# Patient Record
Sex: MALE | Race: WHITE | NOT HISPANIC OR LATINO | Employment: OTHER | URBAN - METROPOLITAN AREA
[De-identification: names, ages, dates, MRNs, and addresses within clinical notes are randomized per-mention and may not be internally consistent; named-entity substitution may affect disease eponyms.]

---

## 2018-04-25 ENCOUNTER — RECORDS - HEALTHEAST (OUTPATIENT)
Dept: LAB | Facility: CLINIC | Age: 73
End: 2018-04-25

## 2018-04-26 LAB
ANION GAP SERPL CALCULATED.3IONS-SCNC: 12 MMOL/L (ref 5–18)
BUN SERPL-MCNC: 11 MG/DL (ref 8–28)
CALCIUM SERPL-MCNC: 10.1 MG/DL (ref 8.5–10.5)
CHLORIDE BLD-SCNC: 100 MMOL/L (ref 98–107)
CO2 SERPL-SCNC: 26 MMOL/L (ref 22–31)
CREAT SERPL-MCNC: 0.7 MG/DL (ref 0.7–1.3)
ERYTHROCYTE [DISTWIDTH] IN BLOOD BY AUTOMATED COUNT: 13.5 % (ref 11–14.5)
GFR SERPL CREATININE-BSD FRML MDRD: >60 ML/MIN/1.73M2
GLUCOSE BLD-MCNC: 114 MG/DL (ref 70–125)
HCT VFR BLD AUTO: 43.7 % (ref 40–54)
HGB BLD-MCNC: 14.5 G/DL (ref 14–18)
MCH RBC QN AUTO: 32.2 PG (ref 27–34)
MCHC RBC AUTO-ENTMCNC: 33.2 G/DL (ref 32–36)
MCV RBC AUTO: 97 FL (ref 80–100)
PLATELET # BLD AUTO: 252 THOU/UL (ref 140–440)
PMV BLD AUTO: 10.7 FL (ref 8.5–12.5)
POTASSIUM BLD-SCNC: 3.9 MMOL/L (ref 3.5–5)
RBC # BLD AUTO: 4.51 MILL/UL (ref 4.4–6.2)
SODIUM SERPL-SCNC: 138 MMOL/L (ref 136–145)
WBC: 5.8 THOU/UL (ref 4–11)

## 2020-10-12 ENCOUNTER — RECORDS - HEALTHEAST (OUTPATIENT)
Dept: LAB | Facility: CLINIC | Age: 75
End: 2020-10-12

## 2020-10-13 LAB
ALBUMIN SERPL-MCNC: 3.1 G/DL (ref 3.5–5)
ALP SERPL-CCNC: 89 U/L (ref 45–120)
ALT SERPL W P-5'-P-CCNC: 18 U/L (ref 0–45)
ANION GAP SERPL CALCULATED.3IONS-SCNC: 5 MMOL/L (ref 5–18)
AST SERPL W P-5'-P-CCNC: 16 U/L (ref 0–40)
BILIRUB DIRECT SERPL-MCNC: 0.2 MG/DL
BILIRUB SERPL-MCNC: 0.5 MG/DL (ref 0–1)
BUN SERPL-MCNC: 9 MG/DL (ref 8–28)
CALCIUM SERPL-MCNC: 9.3 MG/DL (ref 8.5–10.5)
CHLORIDE BLD-SCNC: 101 MMOL/L (ref 98–107)
CHOLEST SERPL-MCNC: 113 MG/DL
CO2 SERPL-SCNC: 30 MMOL/L (ref 22–31)
CREAT SERPL-MCNC: 0.69 MG/DL (ref 0.7–1.3)
ERYTHROCYTE [DISTWIDTH] IN BLOOD BY AUTOMATED COUNT: 15.7 % (ref 11–14.5)
FASTING STATUS PATIENT QL REPORTED: YES
GFR SERPL CREATININE-BSD FRML MDRD: >60 ML/MIN/1.73M2
GLUCOSE BLD-MCNC: 95 MG/DL (ref 70–125)
HBA1C MFR BLD: 6 %
HCT VFR BLD AUTO: 40 % (ref 40–54)
HDLC SERPL-MCNC: 30 MG/DL
HGB BLD-MCNC: 13 G/DL (ref 14–18)
LDLC SERPL CALC-MCNC: 67 MG/DL
MCH RBC QN AUTO: 28 PG (ref 27–34)
MCHC RBC AUTO-ENTMCNC: 32.5 G/DL (ref 32–36)
MCV RBC AUTO: 86 FL (ref 80–100)
PLATELET # BLD AUTO: 331 THOU/UL (ref 140–440)
PMV BLD AUTO: 9.9 FL (ref 8.5–12.5)
POTASSIUM BLD-SCNC: 4.1 MMOL/L (ref 3.5–5)
PROT SERPL-MCNC: 7.4 G/DL (ref 6–8)
RBC # BLD AUTO: 4.65 MILL/UL (ref 4.4–6.2)
SODIUM SERPL-SCNC: 136 MMOL/L (ref 136–145)
TRIGL SERPL-MCNC: 80 MG/DL
TSH SERPL DL<=0.005 MIU/L-ACNC: 1.85 UIU/ML (ref 0.3–5)
WBC: 5.7 THOU/UL (ref 4–11)

## 2023-09-09 ENCOUNTER — APPOINTMENT (OUTPATIENT)
Dept: GENERAL RADIOLOGY | Facility: CLINIC | Age: 78
End: 2023-09-09
Attending: STUDENT IN AN ORGANIZED HEALTH CARE EDUCATION/TRAINING PROGRAM
Payer: MEDICARE

## 2023-09-09 ENCOUNTER — APPOINTMENT (OUTPATIENT)
Dept: CT IMAGING | Facility: CLINIC | Age: 78
End: 2023-09-09
Attending: STUDENT IN AN ORGANIZED HEALTH CARE EDUCATION/TRAINING PROGRAM
Payer: MEDICARE

## 2023-09-09 ENCOUNTER — HOSPITAL ENCOUNTER (OUTPATIENT)
Facility: CLINIC | Age: 78
Setting detail: OBSERVATION
Discharge: HOME | End: 2023-09-11
Attending: STUDENT IN AN ORGANIZED HEALTH CARE EDUCATION/TRAINING PROGRAM | Admitting: INTERNAL MEDICINE
Payer: MEDICARE

## 2023-09-09 DIAGNOSIS — M79.662 PAIN OF LEFT LOWER LEG: ICD-10-CM

## 2023-09-09 DIAGNOSIS — M54.50 ACUTE BILATERAL LOW BACK PAIN WITHOUT SCIATICA: ICD-10-CM

## 2023-09-09 DIAGNOSIS — R53.1 WEAKNESS: ICD-10-CM
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
## 2023-09-09 DIAGNOSIS — Z87.820 HISTORY OF TRAUMATIC BRAIN INJURY: ICD-10-CM

## 2023-09-09 DIAGNOSIS — Z86.73 HISTORY OF CVA (CEREBROVASCULAR ACCIDENT): ICD-10-CM

## 2023-09-09 LAB
ANION GAP SERPL CALCULATED.3IONS-SCNC: 15 MMOL/L (ref 7–15)
BASOPHILS # BLD AUTO: 0.1 10E3/UL (ref 0–0.2)
BASOPHILS NFR BLD AUTO: 1 %
BUN SERPL-MCNC: 10.5 MG/DL (ref 8–23)
CALCIUM SERPL-MCNC: 9.2 MG/DL (ref 8.8–10.2)
CHLORIDE SERPL-SCNC: 96 MMOL/L (ref 98–107)
CREAT SERPL-MCNC: 0.53 MG/DL (ref 0.67–1.17)
DEPRECATED HCO3 PLAS-SCNC: 22 MMOL/L (ref 22–29)
EGFRCR SERPLBLD CKD-EPI 2021: >90 ML/MIN/1.73M2
EOSINOPHIL # BLD AUTO: 0.1 10E3/UL (ref 0–0.7)
EOSINOPHIL NFR BLD AUTO: 2 %
ERYTHROCYTE [DISTWIDTH] IN BLOOD BY AUTOMATED COUNT: 14.8 % (ref 10–15)
GLUCOSE SERPL-MCNC: 116 MG/DL (ref 70–99)
HCT VFR BLD AUTO: 40.6 % (ref 40–53)
HGB BLD-MCNC: 13.5 G/DL (ref 13.3–17.7)
IMM GRANULOCYTES # BLD: 0 10E3/UL
IMM GRANULOCYTES NFR BLD: 0 %
LYMPHOCYTES # BLD AUTO: 1.6 10E3/UL (ref 0.8–5.3)
LYMPHOCYTES NFR BLD AUTO: 22 %
MCH RBC QN AUTO: 29.4 PG (ref 26.5–33)
MCHC RBC AUTO-ENTMCNC: 33.3 G/DL (ref 31.5–36.5)
MCV RBC AUTO: 89 FL (ref 78–100)
MONOCYTES # BLD AUTO: 0.6 10E3/UL (ref 0–1.3)
MONOCYTES NFR BLD AUTO: 9 %
NEUTROPHILS # BLD AUTO: 4.9 10E3/UL (ref 1.6–8.3)
NEUTROPHILS NFR BLD AUTO: 66 %
NRBC # BLD AUTO: 0 10E3/UL
NRBC BLD AUTO-RTO: 0 /100
PLATELET # BLD AUTO: 345 10E3/UL (ref 150–450)
POTASSIUM SERPL-SCNC: 4.1 MMOL/L (ref 3.4–5.3)
RBC # BLD AUTO: 4.59 10E6/UL (ref 4.4–5.9)
SODIUM SERPL-SCNC: 133 MMOL/L (ref 136–145)
WBC # BLD AUTO: 7.3 10E3/UL (ref 4–11)

## 2023-09-09 PROCEDURE — G0378 HOSPITAL OBSERVATION PER HR: HCPCS

## 2023-09-09 PROCEDURE — 250N000013 HC RX MED GY IP 250 OP 250 PS 637: Performed by: INTERNAL MEDICINE

## 2023-09-09 PROCEDURE — 99223 1ST HOSP IP/OBS HIGH 75: CPT | Mod: AI | Performed by: INTERNAL MEDICINE

## 2023-09-09 PROCEDURE — 96374 THER/PROPH/DIAG INJ IV PUSH: CPT

## 2023-09-09 PROCEDURE — 250N000013 HC RX MED GY IP 250 OP 250 PS 637: Performed by: STUDENT IN AN ORGANIZED HEALTH CARE EDUCATION/TRAINING PROGRAM

## 2023-09-09 PROCEDURE — 73552 X-RAY EXAM OF FEMUR 2/>: CPT | Mod: LT

## 2023-09-09 PROCEDURE — 96375 TX/PRO/DX INJ NEW DRUG ADDON: CPT

## 2023-09-09 PROCEDURE — 70450 CT HEAD/BRAIN W/O DYE: CPT

## 2023-09-09 PROCEDURE — 250N000011 HC RX IP 250 OP 636: Performed by: STUDENT IN AN ORGANIZED HEALTH CARE EDUCATION/TRAINING PROGRAM

## 2023-09-09 PROCEDURE — 72125 CT NECK SPINE W/O DYE: CPT

## 2023-09-09 PROCEDURE — 72131 CT LUMBAR SPINE W/O DYE: CPT

## 2023-09-09 PROCEDURE — 99285 EMERGENCY DEPT VISIT HI MDM: CPT | Mod: 25

## 2023-09-09 PROCEDURE — G1010 CDSM STANSON: HCPCS

## 2023-09-09 PROCEDURE — 80048 BASIC METABOLIC PNL TOTAL CA: CPT | Performed by: STUDENT IN AN ORGANIZED HEALTH CARE EDUCATION/TRAINING PROGRAM

## 2023-09-09 PROCEDURE — 85025 COMPLETE CBC W/AUTO DIFF WBC: CPT | Performed by: STUDENT IN AN ORGANIZED HEALTH CARE EDUCATION/TRAINING PROGRAM

## 2023-09-09 PROCEDURE — 72128 CT CHEST SPINE W/O DYE: CPT

## 2023-09-09 PROCEDURE — 36415 COLL VENOUS BLD VENIPUNCTURE: CPT | Performed by: STUDENT IN AN ORGANIZED HEALTH CARE EDUCATION/TRAINING PROGRAM

## 2023-09-09 PROCEDURE — 250N000011 HC RX IP 250 OP 636: Mod: JZ | Performed by: INTERNAL MEDICINE

## 2023-09-09 PROCEDURE — 72170 X-RAY EXAM OF PELVIS: CPT

## 2023-09-09 RX ORDER — OXYCODONE HYDROCHLORIDE 5 MG/1
5 TABLET ORAL ONCE
Status: COMPLETED | OUTPATIENT
Start: 2023-09-09 | End: 2023-09-09

## 2023-09-09 RX ORDER — AMOXICILLIN 250 MG
2 CAPSULE ORAL 2 TIMES DAILY
Status: DISCONTINUED | OUTPATIENT
Start: 2023-09-09 | End: 2023-09-11 | Stop reason: HOSPADM

## 2023-09-09 RX ORDER — CLOPIDOGREL BISULFATE 75 MG/1
75 TABLET ORAL DAILY
Status: DISCONTINUED | OUTPATIENT
Start: 2023-09-10 | End: 2023-09-11 | Stop reason: HOSPADM

## 2023-09-09 RX ORDER — NALOXONE HYDROCHLORIDE 0.4 MG/ML
0.2 INJECTION, SOLUTION INTRAMUSCULAR; INTRAVENOUS; SUBCUTANEOUS
Status: DISCONTINUED | OUTPATIENT
Start: 2023-09-09 | End: 2023-09-11 | Stop reason: HOSPADM

## 2023-09-09 RX ORDER — ACETAMINOPHEN 500 MG
1000 TABLET ORAL 3 TIMES DAILY
Status: DISCONTINUED | OUTPATIENT
Start: 2023-09-09 | End: 2023-09-11 | Stop reason: HOSPADM

## 2023-09-09 RX ORDER — FOLIC ACID 1 MG/1
1 TABLET ORAL DAILY
COMMUNITY

## 2023-09-09 RX ORDER — FERROUS SULFATE 325(65) MG
325 TABLET, DELAYED RELEASE (ENTERIC COATED) ORAL DAILY
COMMUNITY

## 2023-09-09 RX ORDER — TAMSULOSIN HYDROCHLORIDE 0.4 MG/1
0.8 CAPSULE ORAL DAILY
COMMUNITY

## 2023-09-09 RX ORDER — NALOXONE HYDROCHLORIDE 0.4 MG/ML
0.4 INJECTION, SOLUTION INTRAMUSCULAR; INTRAVENOUS; SUBCUTANEOUS
Status: DISCONTINUED | OUTPATIENT
Start: 2023-09-09 | End: 2023-09-11 | Stop reason: HOSPADM

## 2023-09-09 RX ORDER — POLYETHYLENE GLYCOL 3350 17 G/17G
17 POWDER, FOR SOLUTION ORAL DAILY PRN
Status: DISCONTINUED | OUTPATIENT
Start: 2023-09-09 | End: 2023-09-11 | Stop reason: HOSPADM

## 2023-09-09 RX ORDER — SCOLOPAMINE TRANSDERMAL SYSTEM 1 MG/1
1 PATCH, EXTENDED RELEASE TRANSDERMAL
Status: ON HOLD | COMMUNITY
End: 2023-09-11

## 2023-09-09 RX ORDER — ARIPIPRAZOLE 30 MG/1
30 TABLET ORAL DAILY
COMMUNITY

## 2023-09-09 RX ORDER — ASPIRIN 81 MG/1
81 TABLET ORAL DAILY
COMMUNITY

## 2023-09-09 RX ORDER — LIDOCAINE 4 G/G
1 PATCH TOPICAL EVERY 24 HOURS
Status: DISCONTINUED | OUTPATIENT
Start: 2023-09-10 | End: 2023-09-11 | Stop reason: HOSPADM

## 2023-09-09 RX ORDER — GLIPIZIDE 10 MG/1
1 TABLET ORAL 3 TIMES DAILY
COMMUNITY

## 2023-09-09 RX ORDER — CLOPIDOGREL BISULFATE 75 MG/1
75 TABLET ORAL DAILY
COMMUNITY

## 2023-09-09 RX ORDER — HYDROMORPHONE HYDROCHLORIDE 1 MG/ML
0.5 INJECTION, SOLUTION INTRAMUSCULAR; INTRAVENOUS; SUBCUTANEOUS ONCE
Status: COMPLETED | OUTPATIENT
Start: 2023-09-09 | End: 2023-09-09

## 2023-09-09 RX ORDER — ARIPIPRAZOLE 15 MG/1
30 TABLET ORAL DAILY
Status: DISCONTINUED | OUTPATIENT
Start: 2023-09-10 | End: 2023-09-11 | Stop reason: HOSPADM

## 2023-09-09 RX ORDER — GLIPIZIDE 10 MG/1
1 TABLET ORAL 3 TIMES DAILY
Status: DISCONTINUED | OUTPATIENT
Start: 2023-09-09 | End: 2023-09-11 | Stop reason: HOSPADM

## 2023-09-09 RX ORDER — AMOXICILLIN 250 MG
1 CAPSULE ORAL 2 TIMES DAILY
Status: DISCONTINUED | OUTPATIENT
Start: 2023-09-09 | End: 2023-09-11 | Stop reason: HOSPADM

## 2023-09-09 RX ORDER — IMIPRAMINE HCL 25 MG
75 TABLET ORAL AT BEDTIME
COMMUNITY

## 2023-09-09 RX ORDER — ONDANSETRON 2 MG/ML
4 INJECTION INTRAMUSCULAR; INTRAVENOUS EVERY 6 HOURS PRN
Status: DISCONTINUED | OUTPATIENT
Start: 2023-09-09 | End: 2023-09-11 | Stop reason: HOSPADM

## 2023-09-09 RX ORDER — TAMSULOSIN HYDROCHLORIDE 0.4 MG/1
0.8 CAPSULE ORAL DAILY
Status: DISCONTINUED | OUTPATIENT
Start: 2023-09-10 | End: 2023-09-11 | Stop reason: HOSPADM

## 2023-09-09 RX ORDER — ONDANSETRON 4 MG/1
4 TABLET, ORALLY DISINTEGRATING ORAL EVERY 6 HOURS PRN
Status: DISCONTINUED | OUTPATIENT
Start: 2023-09-09 | End: 2023-09-11 | Stop reason: HOSPADM

## 2023-09-09 RX ORDER — ASPIRIN 81 MG/1
81 TABLET ORAL DAILY
Status: DISCONTINUED | OUTPATIENT
Start: 2023-09-10 | End: 2023-09-11 | Stop reason: HOSPADM

## 2023-09-09 RX ORDER — ACETAMINOPHEN 500 MG
1000 TABLET ORAL 3 TIMES DAILY
COMMUNITY

## 2023-09-09 RX ORDER — LIDOCAINE 4 G/G
1 PATCH TOPICAL EVERY 24 HOURS
COMMUNITY

## 2023-09-09 RX ADMIN — IMIPRAMINE HYDROCHLORIDE 75 MG: 50 TABLET ORAL at 20:48

## 2023-09-09 RX ADMIN — OXYCODONE HYDROCHLORIDE 5 MG: 5 TABLET ORAL at 15:05

## 2023-09-09 RX ADMIN — SENNOSIDES AND DOCUSATE SODIUM 1 TABLET: 50; 8.6 TABLET ORAL at 20:48

## 2023-09-09 RX ADMIN — ONDANSETRON 4 MG: 2 INJECTION INTRAMUSCULAR; INTRAVENOUS at 20:59

## 2023-09-09 RX ADMIN — ACETAMINOPHEN 1000 MG: 500 TABLET, FILM COATED ORAL at 20:48

## 2023-09-09 RX ADMIN — HYDROMORPHONE HYDROCHLORIDE 0.5 MG: 1 INJECTION, SOLUTION INTRAMUSCULAR; INTRAVENOUS; SUBCUTANEOUS at 09:25

## 2023-09-09 RX ADMIN — OXYCODONE HYDROCHLORIDE 2.5 MG: 5 TABLET ORAL at 20:14

## 2023-09-09 RX ADMIN — DEXTRAN 70, GLYCERIN, HYPROMELLOSE 1 DROP: 1; 2; 3 SOLUTION/ DROPS OPHTHALMIC at 20:48

## 2023-09-09 ASSESSMENT — ACTIVITIES OF DAILY LIVING (ADL)
ADLS_ACUITY_SCORE: 35
ADLS_ACUITY_SCORE: 51
ADLS_ACUITY_SCORE: 35
ADLS_ACUITY_SCORE: 51
ADLS_ACUITY_SCORE: 51
ADLS_ACUITY_SCORE: 35

## 2023-09-09 NOTE — PLAN OF CARE
"Goal Outcome Evaluation:      Plan of Care Reviewed With: patient    Overall Patient Progress: no changeOverall Patient Progress: no change         PRIMARY DIAGNOSIS: ACUTE PAIN  OUTPATIENT/OBSERVATION GOALS TO BE MET BEFORE DISCHARGE:  1. Pain Status: Improved-controlled with oral pain medications.    2. Return to near baseline physical activity: No    3. Cleared for discharge by consultants (if involved): No    Discharge Planner Nurse   Safe discharge environment identified: No  Barriers to discharge: Yes       Entered by: Carol Roca RN 09/09/2023      Please review provider order for any additional goals.   Nurse to notify provider when observation goals have been met and patient is ready for discharge.    On initial assessment pt w/ continued HTN, all other VSS. Pt disorientated to time stating that it is 2002. Able to state that he is at a hospital in [place], but not sure why he is here, \"I fell\" does not remember why. Pt states that is L-foot is \"paralyzed\", reflexes equal in both feet, sensation equal in both feet, flexion equal and strong in both feet. Pt stating that pain is \"moderate\", denies the need for intervention. Regular diet ordered, denies N&/V, denies CP/SOB. C/o L-hip pain rating 4/10, no bruising or erythema, denies the  need for intervention. Frequent reorientation needed. Frequent rounding by writer.   "

## 2023-09-09 NOTE — H&P
"[facility]    History and Physical  Hospitalist       Date of Admission:  9/9/2023    Assessment & Plan   Sukh Robbins is a 77 year old man who presents with a fall and pain limiting ability to ambulate. 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 Patient has been residing in an assisted living facility in [place] where he receives assistance with medications and supervision with twice weekly showers. He has been able to dress himself, toilet independently. Facility provides meals. Patient ambulates with walker there. For the past 3-4 weeks, patient has been having increasing radicular left leg pain and more difficulty walking, as well as pain on sitting and standing. Sister Kisha and brother-in-law assist patient and leave nearby there and have provided history. They note that patient has undergone recent MRI spine imaging and was seen by neurosurgery who recommended against injections at that time and recommended physical therapy. Patient has been working with in-house PT and was planned to start outpatient PT on 9/20 after return from this [place] trip.     Patient came to [place] for a reunion and sister Akua was staying in adjoining hotel room with patient. On 9/9, he was seated on his rollator seat while brushing his teeth and one of the wheels broke off which led patient to fall. Reporting increased left leg and back pain after the fall, limiting ability to ambulate. Brought to ER for evaluation. X-ray and CT imaging negative for acute fractures. Does show evidence of degenerative changes and spinal stenosis. Planning to bring in for observation and to work with PT. HCP Kisha does note that they are hoping to get patient back to [facility] as soon as able to travel. Her  will fly out to take patient back to [place].     Fall " secondary to equipment failure  Back pain  Acute on chronic left radicular pain   - Patient will need walker on discharge.  - PT consulted. If TCU is recommended, family wants patient to return to [facility] facility and can arrange increased care there.  - Continue scheduled Tylenol, lidocaine patch. Recommending heat. Oxycodone 2.5 mg PO PRN pain.   - If pain unrelenting, could consult neurosurgery and consider if patient would benefit from LINETTE. Holding PTA Plavix and aspirin in case this is pursued.     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    TBI  Cognitive impairment  Depression  - Patient is not an accurate historian. HCP is sister Kisha and would call her at [phone] for any questions.  - Note that scopolamine patch was started in past month for increased drooling. Discontinuing at this time and will monitor. Anticholinergic not optimal treatment option for this patient.   - Will continue PTA doses of imipramine and aripiprazole at this time. Unclear why not on more first line agents, but will defer to [place] PCP.    BPH  - Continue PTA tamsulosin.    DVT Prophylaxis: Ambulate every shift is initial plan. If unable to ambulate or discharge in next 24-48 hours, will start SCDs, though suspect these may cause more confusion  Code Status: Full Code per discussion with HCP sister Kisha  Expected discharge: Pending PT evaluation and ability to ambulate. HCP Kisha does note that they are hoping to get patient back to [facility] facility as soon as able to travel. Her  will fly out to take patient back to [place].     Concha Mills MD FACP  Hospitalist Service  [facility]  Text Page (8am - 5pm)      Primary Care Physician   Provider at [facility] in [place]    Chief Complaint   Fall with increased pain limiting ambulation    History is obtained from the patient and patient's sister  "Kisha, [phone]    History of Present Illness   Sukh Robbins is a 77 year old man who presents with a fall and pain limiting ability to ambulate. 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 Patient has been residing in an assisted living facility in [place] where he receives assistance with medications and supervision with twice weekly showers. He has been able to dress himself, toilet independently. Facility provides meals. Patient ambulates with walker there. For the past 3-4 weeks, patient has been having increasing radicular left leg pain and more difficulty walking, as well as pain on sitting and standing. Sister Kisha and brother-in-law assist patient and leave nearby there and have provided history. They note that patient has undergone recent MRI spine imaging and was seen by neurosurgery who recommended against injections at that time and recommended physical therapy. Patient has been working with in-house PT and was planned to start outpatient PT on 9/20 after return from this [place] trip.     Patient came to [place] for a reunion and sister Akua was staying in adjoining hotel room with patient. On 9/9, he was seated on his rollator seat while brushing his teeth and one of the wheels broke off which led patient to fall. Reporting increased left leg and back pain after the fall, limiting ability to ambulate. Brought to ER for evaluation. X-ray and CT imaging negative for acute fractures. Does show evidence of degenerative changes and spinal stenosis. Planning to bring in for observation and to work with PT. HCP Kisha does note that they are hoping to get patient back to [facility] as soon as able to travel. Her  will fly out to take patient back to [place].       Past Medical History    I have reviewed this patient's medical history and " updated it with pertinent information if needed.   Past Medical History:   Diagnosis Date    TBI (traumatic brain injury) (H)        Past Surgical History   I have reviewed this patient's surgical history and updated it with pertinent information if needed.  Past Surgical History:   Procedure Laterality Date    hernia surgery         Prior to Admission Medications   Prior to Admission Medications   Prescriptions Last Dose Informant Patient Reported? Taking?   ARIPiprazole (ABILIFY) 30 MG tablet 9/9/2023 at AM Other Yes Yes   Sig: Take 30 mg by mouth daily   Lidocaine (LIDOCARE) 4 % Patch 9/9/2023 at AM Other Yes Yes   Sig: Place 1 patch onto the skin every 24 hours To prevent lidocaine toxicity, patient should be patch free for 12 hrs daily.   acetaminophen (TYLENOL) 500 MG tablet 9/9/2023 at AM Other Yes Yes   Sig: Take 1,000 mg by mouth 3 times daily   artificial tears (GENTEAL) 0.1-0.2-0.3 % ophthalmic solution 9/9/2023 at AM Other Yes Yes   Sig: Place 1 drop into both eyes 3 times daily   aspirin 81 MG EC tablet 9/9/2023 at AM Other Yes Yes   Sig: Take 81 mg by mouth daily   cholecalciferol (VITAMIN D3) 25 mcg (1000 units) capsule 9/9/2023 at AM Other Yes Yes   Sig: Take 1 capsule by mouth daily   clopidogrel (PLAVIX) 75 MG tablet 9/9/2023 at AM Other Yes Yes   Sig: Take 75 mg by mouth daily   ferrous sulfate (FE TABS) 325 (65 Fe) MG EC tablet 9/9/2023 at AM Other Yes Yes   Sig: Take 325 mg by mouth daily   folic acid (FOLVITE) 1 MG tablet 9/9/2023 at AM Other Yes Yes   Sig: Take 1 mg by mouth daily   imipramine (TOFRANIL) 25 MG tablet 9/8/2023 at HS Other Yes Yes   Sig: Take 75 mg by mouth At Bedtime   scopolamine (TRANSDERM) 1 MG/3DAYS 72 hr patch Past Week at unknown Other Yes Yes   Sig: Place 1 patch onto the skin every 72 hours   tamsulosin (FLOMAX) 0.4 MG capsule 9/9/2023 at AM Other Yes Yes   Sig: Take 0.8 mg by mouth daily      Facility-Administered Medications: None     Allergies   Allergies   Allergen  Reactions    Pcn [Penicillins]        Social History   Patient has been residing in an assisted living facility in [place] where he receives assistance with medications and supervision with twice weekly showers. He has been able to dress himself, toilet independently. Facility provides meals. Patient ambulates with walker there. For the past 3-4 weeks, patient has been having increasing radicular left leg pain and more difficulty walking, as well as pain on sitting and standing. Sister Kisha and brother-in-law assist patient and leave nearby there and have provided history.    Family History   I have reviewed this patient's family history and updated it with pertinent information if needed.   No family history on file.    Review of Systems   Unless otherwise stated above, remainder of 10 point review of systems is negative.     Physical Exam   Temp: 98.8  F (37.1  C) Temp src: Oral BP: (!) 154/97 Pulse: 95   Resp: 18 SpO2: 96 % O2 Device: None (Room air)    Vital Signs with Ranges  Temp:  [97.9  F (36.6  C)-98.8  F (37.1  C)] 98.8  F (37.1  C)  Pulse:  [70-98] 95  Resp:  [10-18] 18  BP: (143-198)/() 154/97  SpO2:  [92 %-98 %] 96 %  181 lbs 3.49 oz    Constitutional: Older gentleman seen resting in bed. Known TBI at baseline. Alert. Oriented to person. No acute distress. Non-toxic, but does appear diaphoretic.   HEENT: NCAT. EOMI. Moist oral mucosa.  Respiratory: Clear to auscultation bilaterally. No crackles or wheezes.  Cardiovascular: Regular rate and rhythm. No murmur.  GI: Soft, nontender, nondistended. Normoactive bowel sounds.   Musculoskeletal: No gross deformities.   Neurologic: Alert. Orientation as above. Known TBI. Reported right lower extremity weakness, but 4+/5 on my exam. Left leg pain noted   Did not assess gait.    Data   Data reviewed today:  I personally reviewed imaging reports below.    Recent Labs   Lab 09/09/23  1335   WBC 7.3   HGB 13.5   MCV 89      *   POTASSIUM 4.1    CHLORIDE 96*   CO2 22   BUN 10.5   CR 0.53*   ANIONGAP 15   AUGUSTA 9.2   *       Recent Results (from the past 24 hour(s))   CT Head w/o Contrast    Narrative    EXAM: CT HEAD WITHOUT CONTRAST  LOCATION: [facility]  DATE: 09/09/2023    INDICATION: Fall. On thinners, trauma.  COMPARISON: None.  TECHNIQUE: Routine CT Head without IV contrast. Multiplanar reformats. Dose reduction techniques were used.    FINDINGS:  INTRACRANIAL CONTENTS: Mild ex vacuo dilatation of the left lateral ventricle. Small to medium-sized areas of gliosis/encephalomalacia involving the left anterior inferolateral frontal lobe, potentially sequela of previous trauma. There is also a small   area of chronic encephalomalacia involving the posterior paramedian left frontal lobe, potentially sequela of previous infarct. There is a small focus of hypodensity in the right caudate head, potentially a chronic infarct. Mild generalized brain   parenchymal volume loss. Mild patchy nonspecific hypoattenuation in the cerebral white matter, likely due to chronic small vessel ischemic changes. No acute intracranial hemorrhage, extra-axial fluid collection, or mass effect.    VISUALIZED ORBITS/SINUSES/MASTOIDS: Prior bilateral cataract surgery. Visualized portions of the orbits are otherwise unremarkable. Minimal scattered paranasal sinus mucosal thickening. Retention cyst or polyp in the left maxillary sinus. No middle ear   or mastoid effusion.    BONES/SOFT TISSUES: No acute abnormality.      Impression    IMPRESSION:  1.  No acute intracranial hemorrhage identified. No significant extra-axial fluid collection or mass effect/herniation.  2.  Chronic appearing areas of gliosis/encephalomalacia in the left frontal lobe, as described.  3.  Brain atrophy and presumed chronic small vessel ischemic change, as described.       CT Cervical Spine w/o Contrast    Narrative    EXAM: CT CERVICAL SPINE W/O CONTRAST  LOCATION: [facility] [place]  DATE: 9/9/2023    INDICATION: Fall with head strike, trauma.  COMPARISON: None.  TECHNIQUE: Routine CT Cervical Spine without IV contrast. Multiplanar reformats. Dose reduction techniques were used.    FINDINGS:  VERTEBRA: No acute cervical spine fracture is identified. Normal vertebral body heights. Anterolisthesis of C4 on C5 measuring approximately 2 mm. Minimal broad levoconvex curvature of the cervical spine.     CANAL/FORAMINA: Moderate disc height loss at C3-C4 and C5-C6. There is ankylosis across the C6-C7 disc space, which may be on a degenerative basis. Bulky marginal anterior endplate osteophytes are seen from C3-C4 through C6-C7. Scattered uncovertebral   spurring. Moderate to severe facet arthropathy on the right at C3-C4 and C4-C5 and at least moderate/moderate to severe facet arthropathy on the left at C7-T1. Moderate bordering on moderate to severe spinal canal stenosis at C3-C4 with some flattening   of the normal cord contour. There appears to be at least moderate spinal canal stenosis at C4-C5 and moderate to severe spinal canal stenosis at C5-C6 due to degenerative changes. There is severe left C3-C4 neural foraminal stenosis. Severe left and   right C5-C6 neural foraminal stenosis. Moderate to severe right C3-C4 and C4-C5 neural foraminal stenosis. Lesser degrees of neural foraminal narrowing are seen elsewhere.    PARASPINAL: Nonspecific irregular nodular soft tissue in the upper lung zones, possibly scarring, incompletely evaluated.      Impression    IMPRESSION:  1.  No acute cervical spine fracture.  2.  Multilevel degenerative changes, as described. There is up to moderate-to-severe spinal canal stenosis and moderate to severe/severe multilevel neural foraminal stenosis. Please see the body the report for details.  3.  Nonspecific irregular nodular soft tissue density in the upper lung zones, possibly scarring. Consider follow-up chest CT for further evaluation.    CT Thoracic Spine w/o Contrast    Narrative    EXAM: CT THORACIC SPINE WITHOUT CONTRAST  LOCATION: [facility]  DATE: 09/09/2023    INDICATION: Back pain after fall.  COMPARISON: None.  TECHNIQUE: Routine CT Thoracic Spine without IV contrast. Multiplanar reformats. Dose reduction techniques were used.     FINDINGS:  No definite acute thoracic spine fracture is identified. Multilevel Schmorl's nodes are noted. There is minimal anterior wedging/height loss of several mid to lower thoracic vertebral bodies, which could be chronic. There is flowing ossification along   the anterior margins of the T4-T9 vertebral bodies bridging the intervening disc spaces, and there is also a similar pattern extending from T10 down to at least L2. There is focal interruption of the bridging anterior ossification at the level of the   T9-T10 disc space with relatively well-corticated margins, presumably chronic. Overall the findings would be compatible with diffuse idiopathic skeletal hyperostosis. Sagittal alignment appears within normal limits. There is mild to moderate dextroconvex   curvature measuring approximately 20 degrees from the superior endplate of T5 to the inferior endplate of T9 with apex at T7.    Mild multilevel degenerative disc height loss. Varying degrees of multilevel degenerative facet disease, including moderate to severe facet arthropathy on the right at T3-T4, T4-T5, T5-T6, and on the left at T5-T6 and T9-T10. Mild to moderate versus   moderate spinal canal stenosis at T8-T9 and T9-T10 and to a lesser degree at T10-T11. There is moderate to severe neural foraminal stenosis on the right at T2-T3, T3-T4 and to a lesser degree at T4-T5, T5-T6. There is also moderate to severe neural   foraminal stenosis on the left at T9-T10 and to a lesser degree elsewhere.    Indeterminate irregular nodular soft tissues seen in the predominantly perihilar/peribronchial distribution and involving the left  greater than right lungs, following the distribution of the bronchi. This is indeterminate, but may represent scarring or   sequela of nonspecific inflammatory disease. Nonspecific irregular soft tissue nodular density in the left lower lobe measuring up to approximately 16 mm (series 4 image 110). There is a small right pleural effusion/pleural thickening, with a few   scattered calcifications in the right pleural region.      Impression    IMPRESSION:  1.  No definite acute thoracic spine fracture.  2.  Minimal/mild anterior wedging and height loss of a few thoracic vertebral bodies in the mid to lower thoracic region, probably chronic. Focal interruption of anterior flowing ossification along the thoracic vertebral column at the level of T9-T10   with well-corticated margins, also presumably chronic. If there is ongoing concern for an acute thoracic spine fracture, MRI may be helpful for further evaluation.  3.  Multilevel degenerative changes, as described. Varying degrees of spinal canal and neural foraminal stenosis, as described.  4.  Diffuse idiopathic skeletal hyperostosis.  5.  Irregular soft tissue opacities in the perihilar/peribronchial regions bilaterally, nonspecific, possibly inflammatory or related to fibrosis. Small right pleural effusion/pleural thickening. Consider follow-up chest CT.     Lumbar spine CT w/o contrast    Narrative    EXAM: CT LUMBAR SPINE W/O CONTRAST  LOCATION: [facility]  DATE: 9/9/2023    INDICATION: back pain after fall, trauma injury.  COMPARISON: None.  TECHNIQUE: Routine CT Lumbar Spine without IV contrast. Multiplanar reformats. Dose reduction techniques were used.     FINDINGS:  VERTEBRA: 5 lumbar type vertebrae. Mild levocurvature of the lumbar spine. 1 mm spondylolisthesis of L3 on L4. Large anterior bridging osteophytes from T11 to L4. Vertebral body heights are maintained. No pars defects. Ankylosis of the left sacroiliac   joints. No  fractures.    CANAL/FORAMINA: Small broad-based disc bulges at L3-L4 and L4-L5. Mild spinal canal narrowing at L2-L3. Moderate spinal canal narrowing at L3-L4 and L4-L5. Moderate bilateral neuroforaminal narrowing at L4-L5. Moderate right and mild left neuroforaminal   narrowing at L3-L4. Moderate left neuroforaminal narrowing at L2-L3. Severe bilateral neuroforaminal narrowing at L5-S1.    PARASPINAL: 1.6 x 7.3 cm exophytic right renal cyst with CT Hounsfield units of 15.    Diameter of the distal abdominal aorta is not covered on the axial images.      Impression    IMPRESSION:  1.  Large anterior bridging osteophytes from T11 to L4.   2.  No fracture or posttraumatic subluxation.  3.  Moderate spinal canal narrowing at L3-L4 and L4-L5.   4.  Severe bilateral neuroforaminal narrowing at L5-S1.  5.  Moderate bilateral neuroforaminal narrowing at L4-L5. Moderate right and mild left neuroforaminal narrowing at L3-L4. Moderate left neuroforaminal narrowing at L2-L3.    XR Femur Left 2 Views    Narrative    EXAM: XR PELVIS 1/2 VIEWS, XR FEMUR LEFT 2 VIEWS  LOCATION: [facility]  DATE: 9/9/2023    INDICATION: fall with left hip pain  COMPARISON: None.      Impression    IMPRESSION: Single AP view the pelvis. Mild to moderate bilateral hip arthrosis. No acute displaced fracture the pelvis. Nothing to suggest a hip dislocation on this study.     Dedicated views of left femur show no evidence of an acute femoral fracture. There is a knee joint effusion and distal quadriceps tendon enthesopathy.     Recommend dedicated hip series if there is concern for a hip fracture.   XR Pelvis 1/2 Views    Narrative    EXAM: XR PELVIS 1/2 VIEWS, XR FEMUR LEFT 2 VIEWS  LOCATION: [facility]  DATE: 9/9/2023    INDICATION: fall with left hip pain  COMPARISON: None.      Impression    IMPRESSION: Single AP view the pelvis. Mild to moderate bilateral hip arthrosis. No acute displaced fracture the  pelvis. Nothing to suggest a hip dislocation on this study.     Dedicated views of left femur show no evidence of an acute femoral fracture. There is a knee joint effusion and distal quadriceps tendon enthesopathy.     Recommend dedicated hip series if there is concern for a hip fracture.   CT Pelvis Bone wo Contrast    Narrative    EXAM: CT PELVIS BONE WO CONTRAST  LOCATION: [facility]  DATE: 9/9/2023    INDICATION: Concern for occult fracture. Pain.  COMPARISON: Radiographs 09/09/2023.  TECHNIQUE: CT scan of the pelvis was performed without IV contrast. Multiplanar reformats were obtained. Dose reduction techniques were used.  CONTRAST: None.    FINDINGS:    Bones:  - Moderate right and mild to moderate left hip osteoarthrosis.  - No acute fracture or dislocation.  - Moderate to severe degenerative changes in the lower lumbar spine. Mild degenerative changes in sacroiliac joints.    Soft tissues:  - Sigmoid diverticulosis.        Impression    IMPRESSION:  1.  No acute fracture or dislocation.  2.  Degenerative changes in the hips, lumbar spine, sacroiliac joints.  3.  Sigmoid diverticulosis

## 2023-09-09 NOTE — PROGRESS NOTES
"ROOM # 212-2    Living Situation (if not independent, order SW consult):  Facility name:  : [facility]; See chart    Activity level at baseline: Independent w/ rolling walker  Activity level on admit: A2+    Who will be transporting you at discharge: Sister    Patient registered to observation; given Patient Bill of Rights; given the opportunity to ask questions about observation status and their plan of care.  Patient has been oriented to the observation room, bathroom and call light is in place.    Discussed discharge goals and expectations with patient/family.         Pt arrived via ED cart, unable to attempt ambulation d/t pain, air mat used for xfer. Sister at bedside \"only to get him into bed\" Sister told writer pt has a Lidocaine patch on that needs to come off; pt \"pees every 5-minutes\" pt has a 30sec-5 minute-memory span and not to believe his history, said that she would do other admission needs tomorrow.   "

## 2023-09-09 NOTE — PHARMACY-ADMISSION MEDICATION HISTORY
Pharmacist Admission Medication History    Admission medication history is complete. The information provided in this note is only as accurate as the sources available at the time of the update.    Medication reconciliation/reorder completed by provider prior to medication history? No    Information Source(s): Family member via in-person    Pertinent Information: Spoke to patient's sister  Akua for his medications    Changes made to PTA medication list:  Added: All meds  Deleted: benztropine, finasteride, naproxen, fish oil, tramadol and trospium  Changed: None    Medication Affordability:  Not including over the counter (OTC) medications, was there a time in the past 3 months when you did not take your medications as prescribed because of cost?: No    Allergies reviewed with patient and updates made in EHR: no    Medication History Completed By: Cammy Cifuentes RPH 9/9/2023 3:08 PM    Prior to Admission medications    Medication Sig Last Dose Taking? Auth Provider Long Term End Date   acetaminophen (TYLENOL) 500 MG tablet Take 1,000 mg by mouth 3 times daily 9/9/2023 at AM Yes Unknown, Entered By History     ARIPiprazole (ABILIFY) 30 MG tablet Take 30 mg by mouth daily 9/9/2023 at AM Yes Unknown, Entered By History No    artificial tears (GENTEAL) 0.1-0.2-0.3 % ophthalmic solution Place 1 drop into both eyes 3 times daily 9/9/2023 at AM Yes Unknown, Entered By History     aspirin 81 MG EC tablet Take 81 mg by mouth daily 9/9/2023 at AM Yes Unknown, Entered By History     cholecalciferol (VITAMIN D3) 25 mcg (1000 units) capsule Take 1 capsule by mouth daily 9/9/2023 at AM Yes Unknown, Entered By History     clopidogrel (PLAVIX) 75 MG tablet Take 75 mg by mouth daily 9/9/2023 at AM Yes Unknown, Entered By History Yes    ferrous sulfate (FE TABS) 325 (65 Fe) MG EC tablet Take 325 mg by mouth daily 9/9/2023 at AM Yes Unknown, Entered By History     folic acid (FOLVITE) 1 MG tablet Take 1 mg by mouth daily 9/9/2023 at  AM Yes Unknown, Entered By History     imipramine (TOFRANIL) 25 MG tablet Take 75 mg by mouth At Bedtime 9/8/2023 at HS Yes Unknown, Entered By History Yes    Lidocaine (LIDOCARE) 4 % Patch Place 1 patch onto the skin every 24 hours To prevent lidocaine toxicity, patient should be patch free for 12 hrs daily. 9/9/2023 at AM Yes Unknown, Entered By History     scopolamine (TRANSDERM) 1 MG/3DAYS 72 hr patch Place 1 patch onto the skin every 72 hours unknown at unknown Yes Unknown, Entered By History     tamsulosin (FLOMAX) 0.4 MG capsule Take 0.8 mg by mouth daily 9/9/2023 at AM Yes Unknown, Entered By History

## 2023-09-09 NOTE — ED TRIAGE NOTES
Pt. Arrived via EMS. Pt. Is visiting his sister from [place]. Currently he's staying in a hotel. He was siting in his walker, brushing his teeth and one of the wheel broke and fell backwards. His sister called EMS. He's c/o severe back pain and left leg pain.  There's a small skin tear in back of his head. On blood thinners, Asprin. No loss of consciousness.    ABC's intact.      Triage Assessment       Row Name 09/09/23 0851       Triage Assessment (Adult)    Airway WDL WDL       Respiratory WDL    Respiratory WDL WDL       Cardiac WDL    Cardiac WDL WDL
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
## 2023-09-09 NOTE — ED NOTES
RECEIVING UNIT ED HANDOFF REVIEW    Above ED Nurse Handoff Report was reviewed: Yes  Reviewed by: Carol Roca RN on September 9, 2023 at 4:03 PM       [facility]  ED Nurse Handoff Report    ED Chief complaint: Fall (Fell backwards. )  . ED Diagnosis:   Final diagnoses:   Weakness   Pain of left lower leg   Acute bilateral low back pain without sciatica   History of traumatic brain injury   History of CVA (cerebrovascular accident)   Fall, initial encounter       Allergies:   Allergies   Allergen Reactions    Pcn [Penicillins]        Code Status: Full Code    Activity level - Baseline/Home:  Independent with walker  Activity Level - Current:   in bed.   Lift room needed: No.   Bariatric: No   Needed: No   Isolation: No.   Infection: Not Applicable.     Respiratory status: Room air    Vital Signs (within 30 minutes):   Vitals:    09/09/23 1115 09/09/23 1120 09/09/23 1351 09/09/23 1506   BP:  (!) 157/85 (!) 198/106 (!) 143/89   BP Location:       Patient Position:       Cuff Size:       Pulse: 98 87 90 70   Resp: 12 10 17 16   Temp:       TempSrc:       SpO2: 95% 95% 98% 98%   Weight:       Height:           Cardiac Rhythm:  ,      Pain level:    Patient confused: No.   Patient Falls Risk: bed/chair alarm on.   Elimination Status: Has voided     Patient Report - Initial Complaint: Fall.   Focused Assessment: Sukh Robbins is a 77 year old male with history of TBI and CVA presenting via EMS for evaluation after a fall today. Patient lives in [place] and is in town visiting his sister. He is staying in a hotel and was reportedly sitting on his walker brushing his teeth today when one of the wheels on his walker broke and he fell backwards. He hit the back of his head, but did not lose consciousness during the fall. He stayed in place on the ground until EMS arrival. Patient currently endorses left leg pain and low back pain. He says the pain is mostly localized to the left hip and  thigh, though reports some pain radiating down to his toes as well. His sister adds that he was complaining of the left leg pain before the fall today, though the back pain is new. Denies neck pain.      Abnormal Results:   Labs Ordered and Resulted from Time of ED Arrival to Time of ED Departure   BASIC METABOLIC PANEL - Abnormal       Result Value    Sodium 133 (*)     Potassium 4.1      Chloride 96 (*)     Carbon Dioxide (CO2) 22      Anion Gap 15      Urea Nitrogen 10.5      Creatinine 0.53 (*)     Calcium 9.2      Glucose 116 (*)     GFR Estimate >90     CBC WITH PLATELETS AND DIFFERENTIAL    WBC Count 7.3      RBC Count 4.59      Hemoglobin 13.5      Hematocrit 40.6      MCV 89      MCH 29.4      MCHC 33.3      RDW 14.8      Platelet Count 345      % Neutrophils 66      % Lymphocytes 22      % Monocytes 9      % Eosinophils 2      % Basophils 1      % Immature Granulocytes 0      NRBCs per 100 WBC 0      Absolute Neutrophils 4.9      Absolute Lymphocytes 1.6      Absolute Monocytes 0.6      Absolute Eosinophils 0.1      Absolute Basophils 0.1      Absolute Immature Granulocytes 0.0      Absolute NRBCs 0.0          CT Pelvis Bone wo Contrast   Final Result   IMPRESSION:   1.  No acute fracture or dislocation.   2.  Degenerative changes in the hips, lumbar spine, sacroiliac joints.   3.  Sigmoid diverticulosis      XR Pelvis 1/2 Views   Final Result   IMPRESSION: Single AP view the pelvis. Mild to moderate bilateral hip arthrosis. No acute displaced fracture the pelvis. Nothing to suggest a hip dislocation on this study.       Dedicated views of left femur show no evidence of an acute femoral fracture. There is a knee joint effusion and distal quadriceps tendon enthesopathy.       Recommend dedicated hip series if there is concern for a hip fracture.      XR Femur Left 2 Views   Final Result   IMPRESSION: Single AP view the pelvis. Mild to moderate bilateral hip arthrosis. No acute displaced fracture the pelvis.  Nothing to suggest a hip dislocation on this study.       Dedicated views of left femur show no evidence of an acute femoral fracture. There is a knee joint effusion and distal quadriceps tendon enthesopathy.       Recommend dedicated hip series if there is concern for a hip fracture.      Lumbar spine CT w/o contrast   Final Result   IMPRESSION:   1.  Large anterior bridging osteophytes from T11 to L4.    2.  No fracture or posttraumatic subluxation.   3.  Moderate spinal canal narrowing at L3-L4 and L4-L5.    4.  Severe bilateral neuroforaminal narrowing at L5-S1.   5.  Moderate bilateral neuroforaminal narrowing at L4-L5. Moderate right and mild left neuroforaminal narrowing at L3-L4. Moderate left neuroforaminal narrowing at L2-L3.       CT Thoracic Spine w/o Contrast   Final Result   IMPRESSION:   1.  No definite acute thoracic spine fracture.   2.  Minimal/mild anterior wedging and height loss of a few thoracic vertebral bodies in the mid to lower thoracic region, probably chronic. Focal interruption of anterior flowing ossification along the thoracic vertebral column at the level of T9-T10    with well-corticated margins, also presumably chronic. If there is ongoing concern for an acute thoracic spine fracture, MRI may be helpful for further evaluation.   3.  Multilevel degenerative changes, as described. Varying degrees of spinal canal and neural foraminal stenosis, as described.   4.  Diffuse idiopathic skeletal hyperostosis.   5.  Irregular soft tissue opacities in the perihilar/peribronchial regions bilaterally, nonspecific, possibly inflammatory or related to fibrosis. Small right pleural effusion/pleural thickening. Consider follow-up chest CT.         CT Cervical Spine w/o Contrast   Final Result   IMPRESSION:   1.  No acute cervical spine fracture.   2.  Multilevel degenerative changes, as described. There is up to moderate-to-severe spinal canal stenosis and moderate to severe/severe multilevel  neural foraminal stenosis. Please see the body the report for details.   3.  Nonspecific irregular nodular soft tissue density in the upper lung zones, possibly scarring. Consider follow-up chest CT for further evaluation.      CT Head w/o Contrast   Final Result   IMPRESSION:   1.  No acute intracranial hemorrhage identified. No significant extra-axial fluid collection or mass effect/herniation.   2.  Chronic appearing areas of gliosis/encephalomalacia in the left frontal lobe, as described.   3.  Brain atrophy and presumed chronic small vessel ischemic change, as described.                Treatments provided: See MAR  Family Comments: Sister at the bedside.  OBS brochure/video discussed/provided to patient:  Yes  ED Medications:   Medications   HYDROmorphone (PF) (DILAUDID) injection 0.5 mg (0.5 mg Intravenous $Given 9/9/23 1895)   oxyCODONE (ROXICODONE) tablet 5 mg (5 mg Oral $Given 9/9/23 9243)       Drips infusing:  No  For the majority of the shift this patient was Green.   Interventions performed were none  Sepsis treatment initiated: No    Cares/treatment/interventions/medications to be completed following ED care: Pain meds, incontinent care.     ED Nurse Name: Boone Zamora RN  3:35 PM

## 2023-09-10 ENCOUNTER — APPOINTMENT (OUTPATIENT)
Dept: PHYSICAL THERAPY | Facility: CLINIC | Age: 78
End: 2023-09-10
Attending: INTERNAL MEDICINE
Payer: MEDICARE

## 2023-09-10 LAB
ANION GAP SERPL CALCULATED.3IONS-SCNC: 8 MMOL/L (ref 7–15)
BASOPHILS # BLD AUTO: 0 10E3/UL (ref 0–0.2)
BASOPHILS NFR BLD AUTO: 0 %
BUN SERPL-MCNC: 8.5 MG/DL (ref 8–23)
CALCIUM SERPL-MCNC: 9.5 MG/DL (ref 8.8–10.2)
CHLORIDE SERPL-SCNC: 96 MMOL/L (ref 98–107)
CREAT SERPL-MCNC: 0.53 MG/DL (ref 0.67–1.17)
DEPRECATED HCO3 PLAS-SCNC: 28 MMOL/L (ref 22–29)
EGFRCR SERPLBLD CKD-EPI 2021: >90 ML/MIN/1.73M2
EOSINOPHIL # BLD AUTO: 0.1 10E3/UL (ref 0–0.7)
EOSINOPHIL NFR BLD AUTO: 2 %
ERYTHROCYTE [DISTWIDTH] IN BLOOD BY AUTOMATED COUNT: 14.7 % (ref 10–15)
GLUCOSE SERPL-MCNC: 107 MG/DL (ref 70–99)
HCT VFR BLD AUTO: 39.6 % (ref 40–53)
HGB BLD-MCNC: 13.4 G/DL (ref 13.3–17.7)
IMM GRANULOCYTES # BLD: 0 10E3/UL
IMM GRANULOCYTES NFR BLD: 0 %
LYMPHOCYTES # BLD AUTO: 1.3 10E3/UL (ref 0.8–5.3)
LYMPHOCYTES NFR BLD AUTO: 19 %
MCH RBC QN AUTO: 29.2 PG (ref 26.5–33)
MCHC RBC AUTO-ENTMCNC: 33.8 G/DL (ref 31.5–36.5)
MCV RBC AUTO: 86 FL (ref 78–100)
MONOCYTES # BLD AUTO: 0.9 10E3/UL (ref 0–1.3)
MONOCYTES NFR BLD AUTO: 13 %
NEUTROPHILS # BLD AUTO: 4.7 10E3/UL (ref 1.6–8.3)
NEUTROPHILS NFR BLD AUTO: 66 %
NRBC # BLD AUTO: 0 10E3/UL
NRBC BLD AUTO-RTO: 0 /100
PLATELET # BLD AUTO: 333 10E3/UL (ref 150–450)
POTASSIUM SERPL-SCNC: 4.1 MMOL/L (ref 3.4–5.3)
RBC # BLD AUTO: 4.59 10E6/UL (ref 4.4–5.9)
SODIUM SERPL-SCNC: 132 MMOL/L (ref 136–145)
WBC # BLD AUTO: 7 10E3/UL (ref 4–11)

## 2023-09-10 PROCEDURE — 97161 PT EVAL LOW COMPLEX 20 MIN: CPT | Mod: GP

## 2023-09-10 PROCEDURE — 250N000013 HC RX MED GY IP 250 OP 250 PS 637: Performed by: INTERNAL MEDICINE

## 2023-09-10 PROCEDURE — G0378 HOSPITAL OBSERVATION PER HR: HCPCS

## 2023-09-10 PROCEDURE — 97530 THERAPEUTIC ACTIVITIES: CPT | Mod: GP

## 2023-09-10 PROCEDURE — 36415 COLL VENOUS BLD VENIPUNCTURE: CPT | Performed by: INTERNAL MEDICINE

## 2023-09-10 PROCEDURE — 97116 GAIT TRAINING THERAPY: CPT | Mod: GP

## 2023-09-10 PROCEDURE — 85025 COMPLETE CBC W/AUTO DIFF WBC: CPT | Performed by: INTERNAL MEDICINE

## 2023-09-10 PROCEDURE — 80048 BASIC METABOLIC PNL TOTAL CA: CPT | Performed by: INTERNAL MEDICINE

## 2023-09-10 PROCEDURE — 99232 SBSQ HOSP IP/OBS MODERATE 35: CPT | Performed by: INTERNAL MEDICINE

## 2023-09-10 RX ADMIN — SENNOSIDES AND DOCUSATE SODIUM 1 TABLET: 50; 8.6 TABLET ORAL at 19:44

## 2023-09-10 RX ADMIN — IMIPRAMINE HYDROCHLORIDE 75 MG: 50 TABLET ORAL at 21:29

## 2023-09-10 RX ADMIN — ARIPIPRAZOLE 30 MG: 15 TABLET ORAL at 08:43

## 2023-09-10 RX ADMIN — TAMSULOSIN HYDROCHLORIDE 0.8 MG: 0.4 CAPSULE ORAL at 08:43

## 2023-09-10 RX ADMIN — OXYCODONE HYDROCHLORIDE 2.5 MG: 5 TABLET ORAL at 04:55

## 2023-09-10 RX ADMIN — ACETAMINOPHEN 1000 MG: 500 TABLET, FILM COATED ORAL at 19:44

## 2023-09-10 RX ADMIN — OXYCODONE HYDROCHLORIDE 2.5 MG: 5 TABLET ORAL at 15:21

## 2023-09-10 RX ADMIN — ACETAMINOPHEN 1000 MG: 500 TABLET, FILM COATED ORAL at 13:44

## 2023-09-10 RX ADMIN — SENNOSIDES AND DOCUSATE SODIUM 1 TABLET: 50; 8.6 TABLET ORAL at 08:43

## 2023-09-10 RX ADMIN — DEXTRAN 70, GLYCERIN, HYPROMELLOSE 1 DROP: 1; 2; 3 SOLUTION/ DROPS OPHTHALMIC at 13:49

## 2023-09-10 RX ADMIN — DEXTRAN 70, GLYCERIN, HYPROMELLOSE 1 DROP: 1; 2; 3 SOLUTION/ DROPS OPHTHALMIC at 19:44

## 2023-09-10 RX ADMIN — ACETAMINOPHEN 1000 MG: 500 TABLET, FILM COATED ORAL at 08:44

## 2023-09-10 RX ADMIN — LIDOCAINE 1 PATCH: 4 PATCH TOPICAL at 08:43

## 2023-09-10 ASSESSMENT — ACTIVITIES OF DAILY LIVING (ADL)
ADLS_ACUITY_SCORE: 51
ADLS_ACUITY_SCORE: 41
ADLS_ACUITY_SCORE: 41
ADLS_ACUITY_SCORE: 53
ADLS_ACUITY_SCORE: 41
ADLS_ACUITY_SCORE: 53
ADLS_ACUITY_SCORE: 41
ADLS_ACUITY_SCORE: 53

## 2023-09-10 NOTE — CONSULTS
CM consulted for discharge planning. Per discussion with provider, pt is here visiting from [place] where he resides in [facility]. Sister has been staying with pt in [place] to help support him up until his hospitalization. PT evaluated and is recommending home with SBA/CGA w/ WW for mobility. Per provider, hopeful for pt's sister to transport pt to the airport 9/11 and pt to fly home with WC assist where other sister will receive him and bring him back to [facility]. Provider reports no CM/SW need at this time, will clear consult.     Slime Esteban RN BSN   Inpatient Care Coordination  [facility]   Phone [phone]

## 2023-09-10 NOTE — PROGRESS NOTES
"[facility]    Hospitalist Progress Note      Assessment & Plan   Sukh Robbins is a 77 year old man who was admitted on 9/9/2023 after presenting with a fall and pain limiting ability to ambulate. 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 Patient has been residing in an assisted living facility in [place] where he receives assistance with medications and supervision with twice weekly showers. He has been able to dress himself, toilet independently. Facility provides meals. Patient ambulates with walker there. For the past 3-4 weeks, patient has been having increasing radicular left leg pain and more difficulty walking, as well as pain on sitting and standing. Sister Kisha and brother-in-law Toney provided much of the history as they live nearby and assist patient regularly. They note that patient has undergone recent MRI spine imaging and was seen by neurosurgery who recommended against injections at that time and recommended physical therapy. Patient had been working with in-house PT and was planned to start outpatient PT on 9/20 after return from this [place] trip.      Patient came to [place] for a reunion and sister Akua was staying in adjoining hotel room with patient. On 9/9, he was seated on his rollator seat while brushing his teeth and one of the wheels broke off which led patient to fall. Reporting increased left leg and back pain after the fall, limiting ability to ambulate. Brought to ER for evaluation. X-ray and CT imaging negative for acute fractures. Does show evidence of degenerative changes and spinal stenosis. Patient admitted for observation. Pain has been well controlled and PT is recommending return to [facility]. Planning for discharge home 9/10 with sister Akua who will take patient to the airport where he will fly back to [place] and " family will take him back to assisted living facility.    Fall secondary to equipment failure  Back pain  Acute on chronic left radicular pain   - Patient will need walker on discharge.  - PT consulted. Patient can return to [facility].  - Continue scheduled Tylenol, lidocaine patch. Recommending heat. Can have oxycodone 2.5 mg PO PRN pain.   - Given improvement in pain, planning for discharge home 9/10 with sister Akua who will take patient to the airport where he will fly back to [place] and family will take him back to assisted living facility.     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     TBI  Cognitive impairment  Depression  - Patient is not an accurate historian. HCP is sister Kisha and would call her at [phone] for any questions.  - Note that scopolamine patch was started in past month for increased drooling. Discontinued on admission given increased risk of confusion for this patient with anticholinergic. Follow with PCP.  - Will continue PTA doses of imipramine and aripiprazole at this time. Unclear why not on more first line agents, but will defer to [place] PCP.     BPH  - Continue PTA tamsulosin.    DVT Prophylaxis: Ambulate every shift  Code Status: Full Code  Expected discharge: 9/11/23    Concha Mills MD FACP  Hospitalist Service  [facility]    Interval History   Patient has done well with PT and is able to return to [facility]. Family planning flight to [place] tomorrow and will discharge with a walker.     -Data reviewed today: I reviewed all new labs and imaging results over the last 24 hours.     Physical Exam   Temp: 97.8  F (36.6  C) Temp src: Oral BP: (!) 151/88 Pulse: 83   Resp: 16 SpO2: 95 % O2 Device: None (Room air)    Vitals:    09/09/23 0929 09/09/23 1701   Weight: 81.6 kg (180 lb) 82.2 kg (181 lb 3.5 oz)     Vital Signs with Ranges  Temp:  [97.8  F (36.6  C)-98.8   F (37.1  C)] 97.8  F (36.6  C)  Pulse:  [70-98] 83  Resp:  [16-18] 16  BP: (118-165)/(81-98) 151/88  SpO2:  [92 %-98 %] 95 %  I/O last 3 completed shifts:  In: -   Out: 750 [Urine:750]    Constitutional: Older gentleman seen sitting up in chair after eating his lunch. Known TBI appears to be at baseline. Alert. Oriented to person. Appears comfortable. Denies pain. No acute distress. Non-toxic. No diaphoresis.   HEENT: NCAT. EOMI. Moist oral mucosa.  Respiratory: Clear to auscultation bilaterally. No crackles or wheezes.  Cardiovascular: Regular rate and rhythm. No murmur.  GI: Soft, nontender, nondistended. Normoactive bowel sounds.   Musculoskeletal: No gross deformities.   Neurologic: Alert. Orientation as above. Known TBI. Reported right lower extremity weakness, but 4+/5 on my exam. No left leg pain present on exam. Straight leg testing negative for radiculopathy. Did not personally assess gait.    Medications      acetaminophen  1,000 mg Oral TID    ARIPiprazole  30 mg Oral Daily    artificial tears  1 drop Both Eyes TID    [Held by provider] aspirin  81 mg Oral Daily    [Held by provider] clopidogrel  75 mg Oral Daily    imipramine  75 mg Oral At Bedtime    Lidocaine  1 patch Transdermal Q24H    senna-docusate  1 tablet Oral BID    Or    senna-docusate  2 tablet Oral BID    tamsulosin  0.8 mg Oral Daily       Data   Recent Labs   Lab 09/10/23  0542 09/09/23  1335   WBC 7.0 7.3   HGB 13.4 13.5   MCV 86 89    345   * 133*   POTASSIUM 4.1 4.1   CHLORIDE 96* 96*   CO2 28 22   BUN 8.5 10.5   CR 0.53* 0.53*   ANIONGAP 8 15   [place] 9.5 9.2   * 116*       Recent Results (from the past 24 hour(s))   CT Pelvis Bone wo Contrast    Narrative    EXAM: CT PELVIS BONE WO CONTRAST  LOCATION: [facility]  DATE: 9/9/2023    INDICATION: Concern for occult fracture. Pain.  COMPARISON: Radiographs 09/09/2023.  TECHNIQUE: CT scan of the pelvis was performed without IV contrast. Multiplanar  reformats were obtained. Dose reduction techniques were used.  CONTRAST: None.    FINDINGS:    Bones:  - Moderate right and mild to moderate left hip osteoarthrosis.  - No acute fracture or dislocation.  - Moderate to severe degenerative changes in the lower lumbar spine. Mild degenerative changes in sacroiliac joints.    Soft tissues:  - Sigmoid diverticulosis.        Impression    IMPRESSION:  1.  No acute fracture or dislocation.  2.  Degenerative changes in the hips, lumbar spine, sacroiliac joints.  3.  Sigmoid diverticulosis

## 2023-09-10 NOTE — PLAN OF CARE
PRIMARY DIAGNOSIS: Fall  OUTPATIENT/OBSERVATION GOALS TO BE MET BEFORE DISCHARGE:  ADLs back to baseline: No    Activity and level of assistance: Assist 2x GB/W    Pain status: po oxycodone and heat application pain rating 7/10    Return to near baseline physical activity: No     Discharge Planner Nurse   Safe discharge environment identified: No  Barriers to discharge: Yes       Entered by: Ade Hernandez RN 09/10/2023 5:49 AM     Please review provider order for any additional goals.   Nurse to notify provider when observation goals have been met and patient is ready for discharge.

## 2023-09-10 NOTE — PROVIDER NOTIFICATION
Pt had an episode of emesis (dark brown vomit) about 400ml denies any abdominal pain IV Zofran given. Recently had po Oxycodone about 8pm. Please advise.

## 2023-09-10 NOTE — PLAN OF CARE
Goal Outcome Evaluation:         PRIMARY DIAGNOSIS: Fall, Pain  OUTPATIENT/OBSERVATION GOALS TO BE MET BEFORE DISCHARGE:  ADLs back to baseline: Yes    Activity and level of assistance: Up with standby assistance.    Pain status: Improved-controlled with oral pain medications.    Return to near baseline physical activity: Yes     Discharge Planner Nurse   Safe discharge environment identified: Yes  Barriers to discharge: Yes; PT consult. Also, [place], needs to fly home with his brother-in-law.        Entered by: Darcy Lr RN 09/10/2023      Please review provider order for any additional goals.   Nurse to notify provider when observation goals have been met and patient is ready for discharge.

## 2023-09-10 NOTE — PLAN OF CARE
PRIMARY DIAGNOSIS: Fall  OUTPATIENT/OBSERVATION GOALS TO BE MET BEFORE DISCHARGE:  ADLs back to baseline: No    Activity and level of assistance: Assist 2x GB/W    Pain status: po oxycodone and heat application pain rating 7/10    Return to near baseline physical activity: No     Discharge Planner Nurse   Safe discharge environment identified: No  Barriers to discharge: Yes       Entered by: Ade Hernandez RN 09/10/2023 6:47 AM     Please review provider order for any additional goals.   Nurse to notify provider when observation goals have been met and patient is ready for discharge.    PRN Oxycodone and scheduled Tylenol given for pain. Had 1 episode of emesis,PRN IV Zofran given with good effect, no further nausea or vomiting reported. Heat applied ( t pump) on lower back to help with pain control. Noted a small wound at the back of the head slightly bleeding.

## 2023-09-10 NOTE — PLAN OF CARE
PRIMARY DIAGNOSIS: Fall  OUTPATIENT/OBSERVATION GOALS TO BE MET BEFORE DISCHARGE:  ADLs back to baseline: No    Activity and level of assistance: Assist 2x GB/W    Pain status: po oxycodone and heat application pain rating 7/10    Return to near baseline physical activity: No     Discharge Planner Nurse   Safe discharge environment identified: No  Barriers to discharge: Yes       Entered by: Ade Hernandez RN 09/10/2023 6:50 AM     Please review provider order for any additional goals.   Nurse to notify provider when observation goals have been met and patient is ready for discharge.    Asleep in between cares. No further nausea or vomiting noted. Plan PT consult.

## 2023-09-10 NOTE — PROGRESS NOTES
"   09/10/23 1112   Appointment Info   Signing Clinician's Name / Credentials (PT) Ana Patino DPT   Living Environment   People in Home facility resident   Current Living Arrangements extended care facility  (Assisted living facility)   Home Accessibility no concerns   Transportation Anticipated family or friend will provide   Living Environment Comments Pt resides in [facility] in [facility]. [place] for mobility, receives supervision with showering, IADLs.   Self-Care   Usual Activity Tolerance moderate   Current Activity Tolerance fair   Equipment Currently Used at Home walker, rolling   Fall history within last six months yes   Number of times patient has fallen within last six months 7   Activity/Exercise/Self-Care Comment Utilizes FWW at baseline.   General Information   Onset of Illness/Injury or Date of Surgery 09/09/23   Referring Physician Concha Gomez MD   Patient/Family Therapy Goals Statement (PT) return home   Pertinent History of Current Problem (include personal factors and/or comorbidities that impact the POC) Sukh Robbins is a 77 year old man who presents with a fall and pain limiting ability to ambulate. 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 Patient has been residing in an assisted living facility in [place] where he receives assistance with medications and supervision with twice weekly showers. He has been able to dress himself, toilet independently. Facility provides meals. Patient ambulates with walker there. For the past 3-4 weeks, patient has been having increasing radicular left leg pain and more difficulty walking, as well as pain on sitting and standing. Sister Kisha and brother-in-law assist patient and leave nearby there and have provided history. They note that patient has undergone recent MRI spine imaging and was seen by " neurosurgery who recommended against injections at that time and recommended physical therapy. Patient has been working with in-house PT and was planned to start outpatient PT on 9/20 after return from this [place] trip.   Existing Precautions/Restrictions fall   Cognition   Affect/Mental Status (Cognition) WFL   Orientation Status (Cognition) oriented x 3   Follows Commands (Cognition) WFL   Pain Assessment   Patient Currently in Pain Yes, see Vital Sign flowsheet  (back pain, no rating but reports tolerable)   Integumentary/Edema   Integumentary/Edema Comments normal aging changes   Posture    Posture Forward head position;Protracted shoulders   Range of Motion (ROM)   Range of Motion ROM is WFL   Strength (Manual Muscle Testing)   Strength (Manual Muscle Testing) Deficits observed during functional mobility   Bed Mobility   Comment, (Bed Mobility) SBA supine>sit   Transfers   Comment, (Transfers) SBA sit<>stand with FWW   Gait/Stairs (Locomotion)   Distance in Feet 10'   Comment, (Gait/Stairs) CGA ambulation   Balance   Balance Comments requires UE support on FWW for increased stability   Sensory Examination   Sensory Perception Comments reports impaired sensation on RLE   Clinical Impression   Criteria for Skilled Therapeutic Intervention Yes, treatment indicated   PT Diagnosis (PT) impaired mobility   Influenced by the following impairments decreased activity tolerance, impaired balance, weakness, pain   Functional limitations due to impairments impaired bed mobility, transfers, ambulation   Clinical Presentation (PT Evaluation Complexity) Stable/Uncomplicated   Clinical Presentation Rationale clinical judgement, chart review   Clinical Decision Making (Complexity) low complexity   Planned Therapy Interventions (PT) balance training;bed mobility training;gait training;neuromuscular re-education;patient/family education;strengthening;transfer training;home program guidelines;risk factor education;progressive  activity/exercise   Anticipated Equipment Needs at Discharge (PT)   (pt has FWW at home)   Risk & Benefits of therapy have been explained evaluation/treatment results reviewed;care plan/treatment goals reviewed;risks/benefits reviewed;current/potential barriers reviewed;participants voiced agreement with care plan;participants included;patient   PT Total Evaluation Time   PT Eval, Low Complexity Minutes (77298) 6   Physical Therapy Goals   PT Frequency Daily   PT Predicted Duration/Target Date for Goal Attainment 09/13/23   PT Goals Bed Mobility;Transfers;Gait   PT: Bed Mobility Independent;Supine to/from sit   PT: Transfers Modified independent;Sit to/from stand;Assistive device   PT: Gait Supervision/stand-by assist;Assistive device;150 feet   Interventions   Interventions Quick Adds Gait Training;Therapeutic Activity   Therapeutic Activity   Therapeutic Activities: dynamic activities to improve functional performance Minutes (31944) 6   Gait Training   Gait Training Minutes (92539) 10   PT Discharge Planning   PT Plan progress independence with ambulation, repeated STS, LE therex   PT Discharge Recommendation (DC Rec) home with assist   PT Rationale for DC Rec Pt is presenting close to baseline level of function. Currently pt requiring CGA/SBA with FWW for mobility. Pt resides in residential. Anticipate pt will be able to return to [facility] once medically appropriate.   PT Brief overview of current status SBA/CGA with FWW   Total Session Time   Timed Code Treatment Minutes 16   Total Session Time (sum of timed and untimed services) 22

## 2023-09-11 VITALS
SYSTOLIC BLOOD PRESSURE: 137 MMHG | BODY MASS INDEX: 25.37 KG/M2 | HEART RATE: 91 BPM | DIASTOLIC BLOOD PRESSURE: 55 MMHG | WEIGHT: 181.22 LBS | HEIGHT: 71 IN | RESPIRATION RATE: 16 BRPM | OXYGEN SATURATION: 95 % | TEMPERATURE: 98.1 F

## 2023-09-11 PROCEDURE — 250N000013 HC RX MED GY IP 250 OP 250 PS 637: Performed by: INTERNAL MEDICINE

## 2023-09-11 PROCEDURE — 99239 HOSP IP/OBS DSCHRG MGMT >30: CPT | Performed by: INTERNAL MEDICINE

## 2023-09-11 PROCEDURE — G0378 HOSPITAL OBSERVATION PER HR: HCPCS

## 2023-09-11 RX ORDER — SENNOSIDES 8.6 MG
1 TABLET ORAL AT BEDTIME
Qty: 30 TABLET | Refills: 0 | Status: SHIPPED | OUTPATIENT
Start: 2023-09-11

## 2023-09-11 RX ORDER — OXYCODONE HYDROCHLORIDE 5 MG/1
2.5 TABLET ORAL 2 TIMES DAILY PRN
Qty: 5 TABLET | Refills: 0 | Status: SHIPPED | OUTPATIENT
Start: 2023-09-11

## 2023-09-11 RX ADMIN — ARIPIPRAZOLE 30 MG: 15 TABLET ORAL at 08:44

## 2023-09-11 RX ADMIN — ACETAMINOPHEN 1000 MG: 500 TABLET, FILM COATED ORAL at 08:44

## 2023-09-11 RX ADMIN — ASPIRIN 81 MG: 81 TABLET ORAL at 08:45

## 2023-09-11 RX ADMIN — OXYCODONE HYDROCHLORIDE 2.5 MG: 5 TABLET ORAL at 11:58

## 2023-09-11 RX ADMIN — CLOPIDOGREL BISULFATE 75 MG: 75 TABLET ORAL at 08:45

## 2023-09-11 RX ADMIN — DEXTRAN 70, GLYCERIN, HYPROMELLOSE 1 DROP: 1; 2; 3 SOLUTION/ DROPS OPHTHALMIC at 11:58

## 2023-09-11 RX ADMIN — LIDOCAINE 1 PATCH: 4 PATCH TOPICAL at 08:43

## 2023-09-11 RX ADMIN — TAMSULOSIN HYDROCHLORIDE 0.8 MG: 0.4 CAPSULE ORAL at 08:46

## 2023-09-11 RX ADMIN — SENNOSIDES AND DOCUSATE SODIUM 1 TABLET: 50; 8.6 TABLET ORAL at 08:43

## 2023-09-11 ASSESSMENT — ACTIVITIES OF DAILY LIVING (ADL)
ADLS_ACUITY_SCORE: 41
ADLS_ACUITY_SCORE: 47
ADLS_ACUITY_SCORE: 44
ADLS_ACUITY_SCORE: 41
ADLS_ACUITY_SCORE: 41
ADLS_ACUITY_SCORE: 46
ADLS_ACUITY_SCORE: 45

## 2023-09-11 NOTE — PLAN OF CARE
Goal Outcome Evaluation:     PRIMARY DIAGNOSIS: Fall, Pain  OUTPATIENT/OBSERVATION GOALS TO BE MET BEFORE DISCHARGE:  ADLs back to baseline: Yes     Activity and level of assistance: Up with standby assistance and walker/gait belt     Pain status: Improved-controlled with oral pain medications.     Return to near baseline physical activity: Yes          Discharge Planner Nurse   Safe discharge environment identified: Yes  Barriers to discharge: No       Entered by: Darcy Lr RN 09/10/2023   Please review provider order for any additional goals.   Nurse to notify provider when observation goals have been met and patient is ready for discharge.    Pt took tylenol and oxycodone (2.5mg x1) this shift. Up with assist of 1 and walker/gait belt. Tolerating diet. + bm this shift; voiding without difficulty, some frequency. Plan is for patient to discharge tomorrow. Akua Bowman will escort patient to the airport. Flight is around 1800;  will be here at 1300 tomorrow for discharge (needs walker at discharge).

## 2023-09-11 NOTE — PROGRESS NOTES
PRIMARY DIAGNOSIS: ACUTE PAIN/Fall  OUTPATIENT/OBSERVATION GOALS TO BE MET BEFORE DISCHARGE:  1. Pain Status: Improved-controlled with oral pain medications.    2. Return to near baseline physical activity: Yes    3. Cleared for discharge by consultants (if involved): Yes    Discharge Planner Nurse   Safe discharge environment identified: Yes  Barriers to discharge: No       Entered by: Gaviota Ballard RN 09/11/2023 9:37 AM     Please review provider order for any additional goals.   Nurse to notify provider when observation goals have been met and patient is ready for discharge.

## 2023-09-11 NOTE — PROGRESS NOTES
Patient's After Visit Summary was reviewed with patient and sister.   Patient verbalized understanding of After Visit Summary, recommended follow up and was given an opportunity to ask questions.   Discharge medications sent home with patient/ YES, Discharged with sister     OBSERVATION patient END time: 14:10

## 2023-09-11 NOTE — PLAN OF CARE
PRIMARY DIAGNOSIS: Fall  OUTPATIENT/OBSERVATION GOALS TO BE MET BEFORE DISCHARGE:  ADLs back to baseline: Yes    Activity and level of assistance: Up with standby assistance.    Pain status: Improved-controlled with oral pain medications.    Return to near baseline physical activity: Yes     Discharge Planner Nurse   Safe discharge environment identified: Yes  Barriers to discharge: No       Entered by: Ade Hernandez RN 09/11/2023 1:25 AM     Please review provider order for any additional goals.   Nurse to notify provider when observation goals have been met and patient is ready for discharge.      Scheduled Tylenol given. Urinal at bedside and within reach. Asleep in between cares

## 2023-09-11 NOTE — PLAN OF CARE
PRIMARY DIAGNOSIS: Fall  OUTPATIENT/OBSERVATION GOALS TO BE MET BEFORE DISCHARGE:  ADLs back to baseline: Yes    Activity and level of assistance: Up with standby assistance.    Pain status: Improved-controlled with oral pain medications.    Return to near baseline physical activity: Yes     Discharge Planner Nurse   Safe discharge environment identified: Yes  Barriers to discharge: No       Entered by: Ade Hernandez RN 09/11/2023 1:23 AM     Please review provider order for any additional goals.   Nurse to notify provider when observation goals have been met and patient is ready for discharge.

## 2023-09-11 NOTE — PROGRESS NOTES
Physical Therapy Discharge Summary    Reason for therapy discharge:    Discharged to home.    Progress towards therapy goal(s). See goals on Care Plan in [facility] electronic health record for goal details.  Goals not met.  Barriers to achieving goals:   discharge from facility.    Therapy recommendation(s):    Continue plan with OPPT upon return to [place] as was in place prior to pt coming to [place]

## 2023-09-11 NOTE — PLAN OF CARE
PRIMARY DIAGNOSIS: Fall  OUTPATIENT/OBSERVATION GOALS TO BE MET BEFORE DISCHARGE:  ADLs back to baseline: Yes    Activity and level of assistance: Up with standby assistance.    Pain status: Improved-controlled with oral pain medications.    Return to near baseline physical activity: Yes     Discharge Planner Nurse   Safe discharge environment identified: Yes  Barriers to discharge: No       Entered by: Ade Hernandez RN 09/11/2023 5:46 AM     Please review provider order for any additional goals.   Nurse to notify provider when observation goals have been met and patient is ready for discharge.      Asleep in between cares. Occasional incontinence urinal at bedside and within reach. No pain medication required. Plan discharge today, patient's sister will collect patient at 1pm.

## 2023-09-11 NOTE — DISCHARGE SUMMARY
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 Patient has been residing in an assisted living facility in [place] where he receives assistance with medications and supervision with twice weekly showers. He has been able to dress himself, toilet independently. Facility provides meals. Patient ambulates with walker there. For the past 3-4 weeks, patient has been having increasing radicular left leg pain and more difficulty walking, as well as pain on sitting and standing. Sister Kisha and brother-in-law Toney provided much of the history as they live nearby and assist patient regularly. They note that patient has undergone recent MRI spine imaging and was seen by neurosurgery who recommended against injections at that time and recommended physical therapy. Patient had been working with in-house PT and was planned to start outpatient PT on 9/20 after return from this [place] trip.      Patient came to [place] for a reunion and sister Akua was staying in adjoining hotel room with patient. On 9/9, he was seated on his rollator seat while brushing his teeth and one of the wheels broke off which led patient to fall. Reporting increased left leg and back pain after the fall, limiting ability to ambulate. " Brought to ER for evaluation. X-ray and CT imaging negative for acute fractures. Does show evidence of degenerative changes and spinal stenosis. Patient admitted for observation. Pain has been well controlled and PT is recommending return to [facility]. Patient discharging home 9/11 with sister Akua who will take patient to the airport where he will fly back to [place] and family will take him to his assisted living facility.    Hospital Course   Sukh Robbins was admitted on 9/9/2023.  The following problems were addressed during his hospitalization:    Fall secondary to equipment failure  Back pain  Acute on chronic left radicular pain   - Patient being discharged with walker.   - PT consulted. Patient can return to snf.  - Continue scheduled Tylenol, lidocaine patch. Recommending heat. Can have oxycodone 2.5 mg PO up to twice daily PRN pain. Do not expect him to need this mor than a few days.   - Senna added at bedtime while patient is taking oxycodone.   - Given improvement in pain, patient OK to discharge with sister 9/11. Sister Akua will take patient to the airport where he will fly back to [place] and family will take him back to assisted living facility.     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     TBI  Cognitive impairment  Depression  - Patient is not an accurate historian. HCP is sister Kisha and would call her at [phone] for any questions.  - Note that scopolamine patch was started in past month for increased drooling. Discontinued on admission given increased risk of confusion for this patient with anticholinergic. Follow with PCP.  - Will continue PTA doses of imipramine and aripiprazole at this time. Unclear why not on more first line agents, but will defer to [place] PCP.     BPH  - Continue PTA tamsulosin.    Concha Mills MD [facility]  Hospitalist Service  [facility]      Significant Results and Procedures   Imaging as below    Pending Results   N/A    Code Status   Full Code       Primary Care Physician   Jericho King MD    Physical Exam   Temp: 97.6  F (36.4  C) Temp src: Oral BP: 119/73 Pulse: 84   Resp: 16 SpO2: 96 % O2 Device: None (Room air)    Vitals:    09/09/23 0929 09/09/23 1701   Weight: 81.6 kg (180 lb) 82.2 kg (181 lb 3.5 oz)     Vital Signs with Ranges  Temp:  [97.6  F (36.4  C)-98.5  F (36.9  C)] 97.6  F (36.4  C)  Pulse:  [82-86] 84  Resp:  [16-20] 16  BP: (119-163)/(73-97) 119/73  SpO2:  [94 %-97 %] 96 %  I/O last 3 completed shifts:  In: 600 [P.O.:600]  Out: 350 [Urine:350]    Constitutional: Older gentleman seen resting in bed. Known TBI appears to be at baseline. Alert. Oriented to person and place. Asking appropriate questions. Appears comfortable. Denies pain. No acute distress. Non-toxic. No diaphoresis.   HEENT: NCAT. EOMI. Moist oral mucosa.  Respiratory: Clear to auscultation bilaterally. No crackles or wheezes.  Cardiovascular: Regular rate and rhythm. No murmur.  GI: Soft, nontender, nondistended. Normoactive bowel sounds.   Musculoskeletal: No gross deformities. No left leg pain present on exam. Straight leg testing negative for radiculopathy.  Neurologic: Alert. Orientation as above. Known TBI. Did not personally assess gait.    Discharge Disposition   Discharged to care of sister Akua who [facility] where patient is flying to [place]. Sister Kisha is picking up patient and taking him back to assisted living facility in [place].   Condition at discharge: Stable    Consultations This Hospital Stay   CARE MANAGEMENT / SOCIAL WORK IP CONSULT  PHYSICAL THERAPY ADULT IP CONSULT    Time Spent on this Encounter   Concha ERICKSON MD, personally saw the patient today and spent greater than 30 minutes discharging this patient.    Discharge Orders      Reason for your hospital stay    You were seen after a fall  which resulted in worsening back and left leg pain. This has improved and you are able to ambulate well with a walker. Recommend continuing to follow with physical therapy when you return to [place] as already planned. Follow up with your primary care provider. You can continue to use the oxycodone 2.5 mg as needed for breakthrough pain for the next few days, but I do not expect this to be a long-term medication for you.     Follow-up and recommended labs and tests     Follow up with primary care provider within 7 days for hospital follow- up.  The following labs/tests are recommended: BMP, CBC.     Activity    Your activity upon discharge: activity as tolerated, ambulating with a walker.     Diet    Follow this diet upon discharge: Regular Diet     Discharge Medications   Current Discharge Medication List        START taking these medications    Details   oxyCODONE (ROXICODONE) 5 MG tablet Take 0.5 tablets (2.5 mg) by mouth 2 times daily as needed for breakthrough pain  Qty: 5 tablet, Refills: 0    Associated Diagnoses: Acute bilateral low back pain without sciatica      sennosides (SENOKOT) 8.6 MG tablet Take 1 tablet by mouth At Bedtime to prevent constipation while using oxycodone. Can hold if having loose stools.  Qty: 30 tablet, Refills: 0    Associated Diagnoses: Acute bilateral low back pain without sciatica           CONTINUE these medications which have NOT CHANGED    Details   acetaminophen (TYLENOL) 500 MG tablet Take 1,000 mg by mouth 3 times daily      ARIPiprazole (ABILIFY) 30 MG tablet Take 30 mg by mouth daily      artificial tears (GENTEAL) 0.1-0.2-0.3 % ophthalmic solution Place 1 drop into both eyes 3 times daily      aspirin 81 MG EC tablet Take 81 mg by mouth daily      cholecalciferol (VITAMIN D3) 25 mcg (1000 units) capsule Take 1 capsule by mouth daily      clopidogrel (PLAVIX) 75 MG tablet Take 75 mg by mouth daily      ferrous sulfate (FE TABS) 325 (65 Fe) MG EC tablet Take 325 mg by mouth  daily      folic acid (FOLVITE) 1 MG tablet Take 1 mg by mouth daily      imipramine (TOFRANIL) 25 MG tablet Take 75 mg by mouth At Bedtime      Lidocaine (LIDOCARE) 4 % Patch Place 1 patch onto the skin every 24 hours To prevent lidocaine toxicity, patient should be patch free for 12 hrs daily.      tamsulosin (FLOMAX) 0.4 MG capsule Take 0.8 mg by mouth daily           STOP taking these medications       scopolamine (TRANSDERM) 1 MG/3DAYS 72 hr patch Comments:   Reason for Stopping:             Allergies   Allergies   Allergen Reactions    Pcn [Penicillins]      Data   Most Recent 3 CBC's:  Recent Labs   Lab Test 09/10/23  0542 09/09/23  1335 10/13/20  0600   WBC 7.0 7.3 5.7   HGB 13.4 13.5 13.0*   MCV 86 89 86    345 331      Most Recent 3 BMP's:  Recent Labs   Lab Test 09/10/23  0542 09/09/23  1335 10/13/20  0600   * 133* 136   POTASSIUM 4.1 4.1 4.1   CHLORIDE 96* 96* 101   CO2 28 22 30   BUN 8.5 10.5 9   CR 0.53* 0.53* 0.69*   ANIONGAP 8 15 5   AUGUSTA 9.5 9.2 9.3   * 116* 95     Results for orders placed or performed during the hospital encounter of 09/09/23   CT Head w/o Contrast    Narrative    EXAM: CT HEAD WITHOUT CONTRAST  LOCATION: [facility]  DATE: 09/09/2023    INDICATION: Fall. On thinners, trauma.  COMPARISON: None.  TECHNIQUE: Routine CT Head without IV contrast. Multiplanar reformats. Dose reduction techniques were used.    FINDINGS:  INTRACRANIAL CONTENTS: Mild ex vacuo dilatation of the left lateral ventricle. Small to medium-sized areas of gliosis/encephalomalacia involving the left anterior inferolateral frontal lobe, potentially sequela of previous trauma. There is also a small   area of chronic encephalomalacia involving the posterior paramedian left frontal lobe, potentially sequela of previous infarct. There is a small focus of hypodensity in the right caudate head, potentially a chronic infarct. Mild generalized brain   parenchymal volume loss. Mild  patchy nonspecific hypoattenuation in the cerebral white matter, likely due to chronic small vessel ischemic changes. No acute intracranial hemorrhage, extra-axial fluid collection, or mass effect.    VISUALIZED ORBITS/SINUSES/MASTOIDS: Prior bilateral cataract surgery. Visualized portions of the orbits are otherwise unremarkable. Minimal scattered paranasal sinus mucosal thickening. Retention cyst or polyp in the left maxillary sinus. No middle ear   or mastoid effusion.    BONES/SOFT TISSUES: No acute abnormality.      Impression    IMPRESSION:  1.  No acute intracranial hemorrhage identified. No significant extra-axial fluid collection or mass effect/herniation.  2.  Chronic appearing areas of gliosis/encephalomalacia in the left frontal lobe, as described.  3.  Brain atrophy and presumed chronic small vessel ischemic change, as described.       CT Cervical Spine w/o Contrast    Narrative    EXAM: CT CERVICAL SPINE W/O CONTRAST  LOCATION: [facility]  DATE: 9/9/2023    INDICATION: Fall with head strike, trauma.  COMPARISON: None.  TECHNIQUE: Routine CT Cervical Spine without IV contrast. Multiplanar reformats. Dose reduction techniques were used.    FINDINGS:  VERTEBRA: No acute cervical spine fracture is identified. Normal vertebral body heights. Anterolisthesis of C4 on C5 measuring approximately 2 mm. Minimal broad levoconvex curvature of the cervical spine.     CANAL/FORAMINA: Moderate disc height loss at C3-C4 and C5-C6. There is ankylosis across the C6-C7 disc space, which may be on a degenerative basis. Bulky marginal anterior endplate osteophytes are seen from C3-C4 through C6-C7. Scattered uncovertebral   spurring. Moderate to severe facet arthropathy on the right at C3-C4 and C4-C5 and at least moderate/moderate to severe facet arthropathy on the left at C7-T1. Moderate bordering on moderate to severe spinal canal stenosis at C3-C4 with some flattening   of the normal cord contour.  There appears to be at least moderate spinal canal stenosis at C4-C5 and moderate to severe spinal canal stenosis at C5-C6 due to degenerative changes. There is severe left C3-C4 neural foraminal stenosis. Severe left and   right C5-C6 neural foraminal stenosis. Moderate to severe right C3-C4 and C4-C5 neural foraminal stenosis. Lesser degrees of neural foraminal narrowing are seen elsewhere.    PARASPINAL: Nonspecific irregular nodular soft tissue in the upper lung zones, possibly scarring, incompletely evaluated.      Impression    IMPRESSION:  1.  No acute cervical spine fracture.  2.  Multilevel degenerative changes, as described. There is up to moderate-to-severe spinal canal stenosis and moderate to severe/severe multilevel neural foraminal stenosis. Please see the body the report for details.  3.  Nonspecific irregular nodular soft tissue density in the upper lung zones, possibly scarring. Consider follow-up chest CT for further evaluation.   XR Femur Left 2 Views    Narrative    EXAM: XR PELVIS 1/2 VIEWS, XR FEMUR LEFT 2 VIEWS  LOCATION: [facility]  DATE: 9/9/2023    INDICATION: fall with left hip pain  COMPARISON: None.      Impression    IMPRESSION: Single AP view the pelvis. Mild to moderate bilateral hip arthrosis. No acute displaced fracture the pelvis. Nothing to suggest a hip dislocation on this study.     Dedicated views of left femur show no evidence of an acute femoral fracture. There is a knee joint effusion and distal quadriceps tendon enthesopathy.     Recommend dedicated hip series if there is concern for a hip fracture.   CT Thoracic Spine w/o Contrast    Narrative    EXAM: CT THORACIC SPINE WITHOUT CONTRAST  LOCATION: [facility]  DATE: 09/09/2023    INDICATION: Back pain after fall.  COMPARISON: None.  TECHNIQUE: Routine CT Thoracic Spine without IV contrast. Multiplanar reformats. Dose reduction techniques were used.     FINDINGS:  No definite  acute thoracic spine fracture is identified. Multilevel Schmorl's nodes are noted. There is minimal anterior wedging/height loss of several mid to lower thoracic vertebral bodies, which could be chronic. There is flowing ossification along   the anterior margins of the T4-T9 vertebral bodies bridging the intervening disc spaces, and there is also a similar pattern extending from T10 down to at least L2. There is focal interruption of the bridging anterior ossification at the level of the   T9-T10 disc space with relatively well-corticated margins, presumably chronic. Overall the findings would be compatible with diffuse idiopathic skeletal hyperostosis. Sagittal alignment appears within normal limits. There is mild to moderate dextroconvex   curvature measuring approximately 20 degrees from the superior endplate of T5 to the inferior endplate of T9 with apex at T7.    Mild multilevel degenerative disc height loss. Varying degrees of multilevel degenerative facet disease, including moderate to severe facet arthropathy on the right at T3-T4, T4-T5, T5-T6, and on the left at T5-T6 and T9-T10. Mild to moderate versus   moderate spinal canal stenosis at T8-T9 and T9-T10 and to a lesser degree at T10-T11. There is moderate to severe neural foraminal stenosis on the right at T2-T3, T3-T4 and to a lesser degree at T4-T5, T5-T6. There is also moderate to severe neural   foraminal stenosis on the left at T9-T10 and to a lesser degree elsewhere.    Indeterminate irregular nodular soft tissues seen in the predominantly perihilar/peribronchial distribution and involving the left greater than right lungs, following the distribution of the bronchi. This is indeterminate, but may represent scarring or   sequela of nonspecific inflammatory disease. Nonspecific irregular soft tissue nodular density in the left lower lobe measuring up to approximately 16 mm (series 4 image 110). There is a small right pleural effusion/pleural  thickening, with a few   scattered calcifications in the right pleural region.      Impression    IMPRESSION:  1.  No definite acute thoracic spine fracture.  2.  Minimal/mild anterior wedging and height loss of a few thoracic vertebral bodies in the mid to lower thoracic region, probably chronic. Focal interruption of anterior flowing ossification along the thoracic vertebral column at the level of T9-T10   with well-corticated margins, also presumably chronic. If there is ongoing concern for an acute thoracic spine fracture, MRI may be helpful for further evaluation.  3.  Multilevel degenerative changes, as described. Varying degrees of spinal canal and neural foraminal stenosis, as described.  4.  Diffuse idiopathic skeletal hyperostosis.  5.  Irregular soft tissue opacities in the perihilar/peribronchial regions bilaterally, nonspecific, possibly inflammatory or related to fibrosis. Small right pleural effusion/pleural thickening. Consider follow-up chest CT.     Lumbar spine CT w/o contrast    Narrative    EXAM: CT LUMBAR SPINE W/O CONTRAST  LOCATION: [facility]  DATE: 9/9/2023    INDICATION: back pain after fall, trauma injury.  COMPARISON: None.  TECHNIQUE: Routine CT Lumbar Spine without IV contrast. Multiplanar reformats. Dose reduction techniques were used.     FINDINGS:  VERTEBRA: 5 lumbar type vertebrae. Mild levocurvature of the lumbar spine. 1 mm spondylolisthesis of L3 on L4. Large anterior bridging osteophytes from T11 to L4. Vertebral body heights are maintained. No pars defects. Ankylosis of the left sacroiliac   joints. No fractures.    CANAL/FORAMINA: Small broad-based disc bulges at L3-L4 and L4-L5. Mild spinal canal narrowing at L2-L3. Moderate spinal canal narrowing at L3-L4 and L4-L5. Moderate bilateral neuroforaminal narrowing at L4-L5. Moderate right and mild left neuroforaminal   narrowing at L3-L4. Moderate left neuroforaminal narrowing at L2-L3. Severe bilateral  neuroforaminal narrowing at L5-S1.    PARASPINAL: 1.6 x 7.3 cm exophytic right renal cyst with CT Hounsfield units of 15.    Diameter of the distal abdominal aorta is not covered on the axial images.      Impression    IMPRESSION:  1.  Large anterior bridging osteophytes from T11 to L4.   2.  No fracture or posttraumatic subluxation.  3.  Moderate spinal canal narrowing at L3-L4 and L4-L5.   4.  Severe bilateral neuroforaminal narrowing at L5-S1.  5.  Moderate bilateral neuroforaminal narrowing at L4-L5. Moderate right and mild left neuroforaminal narrowing at L3-L4. Moderate left neuroforaminal narrowing at L2-L3.    XR Pelvis 1/2 Views    Narrative    EXAM: XR PELVIS 1/2 VIEWS, XR FEMUR LEFT 2 VIEWS  LOCATION: [facility]  DATE: 9/9/2023    INDICATION: fall with left hip pain  COMPARISON: None.      Impression    IMPRESSION: Single AP view the pelvis. Mild to moderate bilateral hip arthrosis. No acute displaced fracture the pelvis. Nothing to suggest a hip dislocation on this study.     Dedicated views of left femur show no evidence of an acute femoral fracture. There is a knee joint effusion and distal quadriceps tendon enthesopathy.     Recommend dedicated hip series if there is concern for a hip fracture.   CT Pelvis Bone wo Contrast    Narrative    EXAM: CT PELVIS BONE WO CONTRAST  LOCATION: [facility]  DATE: 9/9/2023    INDICATION: Concern for occult fracture. Pain.  COMPARISON: Radiographs 09/09/2023.  TECHNIQUE: CT scan of the pelvis was performed without IV contrast. Multiplanar reformats were obtained. Dose reduction techniques were used.  CONTRAST: None.    FINDINGS:    Bones:  - Moderate right and mild to moderate left hip osteoarthrosis.  - No acute fracture or dislocation.  - Moderate to severe degenerative changes in the lower lumbar spine. Mild degenerative changes in sacroiliac joints.    Soft tissues:  - Sigmoid diverticulosis.        Impression     IMPRESSION:  1.  No acute fracture or dislocation.  2.  Degenerative changes in the hips, lumbar spine, sacroiliac joints.  3.  Sigmoid diverticulosis